# Patient Record
Sex: FEMALE | Race: WHITE | NOT HISPANIC OR LATINO | Employment: FULL TIME | URBAN - NONMETROPOLITAN AREA
[De-identification: names, ages, dates, MRNs, and addresses within clinical notes are randomized per-mention and may not be internally consistent; named-entity substitution may affect disease eponyms.]

---

## 2017-08-30 ENCOUNTER — OFFICE VISIT (OUTPATIENT)
Dept: UROLOGY | Facility: CLINIC | Age: 32
End: 2017-08-30

## 2017-08-30 VITALS
HEART RATE: 91 BPM | HEIGHT: 67 IN | WEIGHT: 280 LBS | DIASTOLIC BLOOD PRESSURE: 84 MMHG | BODY MASS INDEX: 43.95 KG/M2 | SYSTOLIC BLOOD PRESSURE: 137 MMHG

## 2017-08-30 DIAGNOSIS — N30.90 CYSTITIS: ICD-10-CM

## 2017-08-30 DIAGNOSIS — R35.0 URINARY FREQUENCY: ICD-10-CM

## 2017-08-30 DIAGNOSIS — N39.0 URINARY TRACT INFECTION, SITE UNSPECIFIED: Primary | ICD-10-CM

## 2017-08-30 LAB
BILIRUB BLD-MCNC: NEGATIVE MG/DL
CLARITY, POC: CLEAR
COLOR UR: YELLOW
GLUCOSE UR STRIP-MCNC: NEGATIVE MG/DL
KETONES UR QL: NEGATIVE
LEUKOCYTE EST, POC: NEGATIVE
NITRITE UR-MCNC: NEGATIVE MG/ML
PH UR: 5 [PH] (ref 5–8)
PROT UR STRIP-MCNC: NEGATIVE MG/DL
RBC # UR STRIP: NEGATIVE /UL
SP GR UR: 1.02 (ref 1–1.03)
UROBILINOGEN UR QL: NORMAL

## 2017-08-30 PROCEDURE — 81003 URINALYSIS AUTO W/O SCOPE: CPT | Performed by: NURSE PRACTITIONER

## 2017-08-30 PROCEDURE — 99203 OFFICE O/P NEW LOW 30 MIN: CPT | Performed by: NURSE PRACTITIONER

## 2017-08-30 RX ORDER — NITROFURANTOIN 25; 75 MG/1; MG/1
CAPSULE ORAL
Refills: 0 | COMMUNITY
Start: 2017-08-27

## 2017-08-30 RX ORDER — OXYBUTYNIN CHLORIDE 5 MG/1
5 TABLET ORAL DAILY
Qty: 30 TABLET | Refills: 3 | Status: SHIPPED | OUTPATIENT
Start: 2017-08-30

## 2017-08-30 NOTE — PROGRESS NOTES
Chief Complaint:          Chief Complaint   Patient presents with   • Urinary Tract Infection       HPI:   32 y.o. female for recurrent UTI.  Does complain of urinary frequency, voiding small amounts, suprapubic pain and back pain.  Symptoms started on Sunday at which time she went to Urgent care with she was diagnosed with a UTI per voided specimen without urine culture being completed.  She was started on Nitrofurantoin with improvement of symptoms.  She states the antibiotics will resolve the symptoms while on them but symptoms return within a couple of weeks after finishing.  Drinks 2 cans of decaf pepsi and 3 -4 bottles of water daily.  Hurts most when emptying bladder.  Nocturia 2 -3 times.  Occasional smoker of puffing cigarettes - does not inhale.  No bowel problems. Under a lot of daily stress at work and some at home.  Complains of occasional painful intercourse with some bleeding afterwards.  UA shows negative results.    HPI        Past Medical History:      History reviewed. No pertinent past medical history.      Current Meds:     Current Outpatient Prescriptions   Medication Sig Dispense Refill   • nitrofurantoin, macrocrystal-monohydrate, (MACROBID) 100 MG capsule   0   • oxybutynin (DITROPAN) 5 MG tablet Take 1 tablet by mouth Daily. 30 tablet 3     No current facility-administered medications for this visit.         Allergies:      Allergies   Allergen Reactions   • Penicillins         Past Surgical History:     Past Surgical History:   Procedure Laterality Date   • GALLBLADDER SURGERY           Social History:     Social History     Social History   • Marital status: Single     Spouse name: N/A   • Number of children: N/A   • Years of education: N/A     Occupational History   • Not on file.     Social History Main Topics   • Smoking status: Former Smoker   • Smokeless tobacco: Never Used   • Alcohol use No   • Drug use: No   • Sexual activity: Not on file     Other Topics Concern   • Not on file      Social History Narrative   • No narrative on file       Family History:     History reviewed. No pertinent family history.    Review of Systems:     Review of Systems   Constitutional: Positive for chills. Negative for fatigue and fever.   Respiratory: Negative for cough, choking, shortness of breath and wheezing.    Cardiovascular: Negative for leg swelling.   Gastrointestinal: Negative for abdominal pain, nausea and vomiting.   Musculoskeletal: Positive for back pain. Negative for joint swelling.   Neurological: Positive for dizziness. Negative for headaches.   Psychiatric/Behavioral: Negative for confusion.       Physical Exam:     Physical Exam   Constitutional: She is oriented to person, place, and time. She appears well-developed and well-nourished. No distress.   Abdominal: Soft. Bowel sounds are normal. She exhibits no distension and no mass. There is tenderness. There is no rebound and no guarding. No hernia.   Tenderness and suprapubic region with palpation   Neurological: She is alert and oriented to person, place, and time.   Skin: Skin is warm and dry. No rash noted. She is not diaphoretic. No erythema. No pallor.   Psychiatric: She has a normal mood and affect. Her behavior is normal. Judgment and thought content normal.   Nursing note and vitals reviewed.      Procedure:     No notes on file      Assessment:     Encounter Diagnoses   Name Primary?   • Urinary tract infection, site unspecified Yes   • Cystitis      Orders Placed This Encounter   Procedures   • POC Urinalysis Dipstick, Automated       Plan:   Discussed recurrent urinary tract infections versus cystitis with the patient. Patient's urinalysis today reveals negative results. Will try lifestyle management prior to ordering a cystoscopy hydrodistention for possible interstitial cystitis. Patient is to return to the office in one month for follow-up if she continues to have these symptoms at that time she will be scheduled for a  cystoscopy with bladder hydrodistention by Dr. Garcia or Dr. [place]. A prescription for oxybutynin 5 mg to take 1 tablet daily given for overactive bladder symptoms.    Counseling was given to patient for the following topics diagnostic results, patient and family education and impressions. and the interim medical history and current results were reviewed.  A treatment plan with follow-up was made. Total time of the encounter was 33 minutes and 33 minutes were spent discussing Urinary tract infection, site unspecified [N39.0] face-to-face.       This document has been electronically signed by ALBER Duarte August 30, 2017 4:38 PM